# Patient Record
Sex: FEMALE | Race: WHITE | NOT HISPANIC OR LATINO
[De-identification: names, ages, dates, MRNs, and addresses within clinical notes are randomized per-mention and may not be internally consistent; named-entity substitution may affect disease eponyms.]

---

## 2018-03-02 PROBLEM — Z00.00 ENCOUNTER FOR PREVENTIVE HEALTH EXAMINATION: Status: ACTIVE | Noted: 2018-03-02

## 2018-04-03 ENCOUNTER — APPOINTMENT (OUTPATIENT)
Dept: VASCULAR SURGERY | Facility: CLINIC | Age: 58
End: 2018-04-03

## 2020-05-06 ENCOUNTER — INPATIENT (INPATIENT)
Facility: HOSPITAL | Age: 60
LOS: 1 days | Discharge: HOME HEALTH | End: 2020-05-08
Attending: INTERNAL MEDICINE | Admitting: INTERNAL MEDICINE
Payer: COMMERCIAL

## 2020-05-06 VITALS
RESPIRATION RATE: 17 BRPM | DIASTOLIC BLOOD PRESSURE: 78 MMHG | HEART RATE: 80 BPM | TEMPERATURE: 98 F | SYSTOLIC BLOOD PRESSURE: 168 MMHG | OXYGEN SATURATION: 97 %

## 2020-05-06 LAB
ALBUMIN SERPL ELPH-MCNC: 4.7 G/DL — SIGNIFICANT CHANGE UP (ref 3.5–5.2)
ALP SERPL-CCNC: 164 U/L — HIGH (ref 30–115)
ALT FLD-CCNC: 16 U/L — SIGNIFICANT CHANGE UP (ref 0–41)
ANION GAP SERPL CALC-SCNC: 20 MMOL/L — HIGH (ref 7–14)
APPEARANCE UR: CLEAR — SIGNIFICANT CHANGE UP
AST SERPL-CCNC: 11 U/L — SIGNIFICANT CHANGE UP (ref 0–41)
BACTERIA # UR AUTO: NEGATIVE — SIGNIFICANT CHANGE UP
BASOPHILS # BLD AUTO: 0.06 K/UL — SIGNIFICANT CHANGE UP (ref 0–0.2)
BASOPHILS NFR BLD AUTO: 0.5 % — SIGNIFICANT CHANGE UP (ref 0–1)
BILIRUB DIRECT SERPL-MCNC: 0.4 MG/DL — HIGH (ref 0–0.2)
BILIRUB INDIRECT FLD-MCNC: 2.7 MG/DL — HIGH (ref 0.2–1.2)
BILIRUB SERPL-MCNC: 3.1 MG/DL — HIGH (ref 0.2–1.2)
BILIRUB UR-MCNC: NEGATIVE — SIGNIFICANT CHANGE UP
BUN SERPL-MCNC: 20 MG/DL — SIGNIFICANT CHANGE UP (ref 10–20)
CALCIUM SERPL-MCNC: 9.7 MG/DL — SIGNIFICANT CHANGE UP (ref 8.5–10.1)
CHLORIDE SERPL-SCNC: 96 MMOL/L — LOW (ref 98–110)
CHOLEST SERPL-MCNC: 166 MG/DL — SIGNIFICANT CHANGE UP (ref 100–200)
CO2 SERPL-SCNC: 24 MMOL/L — SIGNIFICANT CHANGE UP (ref 17–32)
COLOR SPEC: YELLOW — SIGNIFICANT CHANGE UP
CREAT SERPL-MCNC: 0.7 MG/DL — SIGNIFICANT CHANGE UP (ref 0.7–1.5)
DIFF PNL FLD: NEGATIVE — SIGNIFICANT CHANGE UP
EOSINOPHIL # BLD AUTO: 0.04 K/UL — SIGNIFICANT CHANGE UP (ref 0–0.7)
EOSINOPHIL NFR BLD AUTO: 0.3 % — SIGNIFICANT CHANGE UP (ref 0–8)
EPI CELLS # UR: 4 /HPF — SIGNIFICANT CHANGE UP (ref 0–5)
GLUCOSE SERPL-MCNC: 206 MG/DL — HIGH (ref 70–99)
GLUCOSE UR QL: NEGATIVE — SIGNIFICANT CHANGE UP
HCT VFR BLD CALC: 46.6 % — SIGNIFICANT CHANGE UP (ref 37–47)
HDLC SERPL-MCNC: 25 MG/DL — LOW
HGB BLD-MCNC: 16 G/DL — SIGNIFICANT CHANGE UP (ref 12–16)
HYALINE CASTS # UR AUTO: 6 /LPF — SIGNIFICANT CHANGE UP (ref 0–7)
IMM GRANULOCYTES NFR BLD AUTO: 0.6 % — HIGH (ref 0.1–0.3)
KETONES UR-MCNC: ABNORMAL
LACTATE SERPL-SCNC: 1.8 MMOL/L — SIGNIFICANT CHANGE UP (ref 0.7–2)
LEUKOCYTE ESTERASE UR-ACNC: NEGATIVE — SIGNIFICANT CHANGE UP
LIDOCAIN IGE QN: 11 U/L — SIGNIFICANT CHANGE UP (ref 7–60)
LIPID PNL WITH DIRECT LDL SERPL: 110 MG/DL — SIGNIFICANT CHANGE UP (ref 4–129)
LYMPHOCYTES # BLD AUTO: 18.6 % — LOW (ref 20.5–51.1)
LYMPHOCYTES # BLD AUTO: 2.2 K/UL — SIGNIFICANT CHANGE UP (ref 1.2–3.4)
MCHC RBC-ENTMCNC: 29 PG — SIGNIFICANT CHANGE UP (ref 27–31)
MCHC RBC-ENTMCNC: 34.3 G/DL — SIGNIFICANT CHANGE UP (ref 32–37)
MCV RBC AUTO: 84.6 FL — SIGNIFICANT CHANGE UP (ref 81–99)
MONOCYTES # BLD AUTO: 0.85 K/UL — HIGH (ref 0.1–0.6)
MONOCYTES NFR BLD AUTO: 7.2 % — SIGNIFICANT CHANGE UP (ref 1.7–9.3)
NEUTROPHILS # BLD AUTO: 8.58 K/UL — HIGH (ref 1.4–6.5)
NEUTROPHILS NFR BLD AUTO: 72.8 % — SIGNIFICANT CHANGE UP (ref 42.2–75.2)
NITRITE UR-MCNC: NEGATIVE — SIGNIFICANT CHANGE UP
NRBC # BLD: 0 /100 WBCS — SIGNIFICANT CHANGE UP (ref 0–0)
PH UR: 6.5 — SIGNIFICANT CHANGE UP (ref 5–8)
PLATELET # BLD AUTO: 290 K/UL — SIGNIFICANT CHANGE UP (ref 130–400)
POTASSIUM SERPL-MCNC: 3.1 MMOL/L — LOW (ref 3.5–5)
POTASSIUM SERPL-SCNC: 3.1 MMOL/L — LOW (ref 3.5–5)
PROT SERPL-MCNC: 7.7 G/DL — SIGNIFICANT CHANGE UP (ref 6–8)
PROT UR-MCNC: ABNORMAL
RBC # BLD: 5.51 M/UL — HIGH (ref 4.2–5.4)
RBC # FLD: 13.1 % — SIGNIFICANT CHANGE UP (ref 11.5–14.5)
RBC CASTS # UR COMP ASSIST: 3 /HPF — SIGNIFICANT CHANGE UP (ref 0–4)
SARS-COV-2 RNA SPEC QL NAA+PROBE: SIGNIFICANT CHANGE UP
SODIUM SERPL-SCNC: 140 MMOL/L — SIGNIFICANT CHANGE UP (ref 135–146)
SP GR SPEC: >1.05 (ref 1.01–1.02)
TOTAL CHOLESTEROL/HDL RATIO MEASUREMENT: 6.6 RATIO — HIGH (ref 4–5.5)
TRIGL SERPL-MCNC: 144 MG/DL — SIGNIFICANT CHANGE UP (ref 10–149)
TROPONIN T SERPL-MCNC: <0.01 NG/ML — SIGNIFICANT CHANGE UP
UROBILINOGEN FLD QL: ABNORMAL
WBC # BLD: 11.8 K/UL — HIGH (ref 4.8–10.8)
WBC # FLD AUTO: 11.8 K/UL — HIGH (ref 4.8–10.8)
WBC UR QL: 3 /HPF — SIGNIFICANT CHANGE UP (ref 0–5)

## 2020-05-06 PROCEDURE — 71045 X-RAY EXAM CHEST 1 VIEW: CPT | Mod: 26

## 2020-05-06 PROCEDURE — 93010 ELECTROCARDIOGRAM REPORT: CPT

## 2020-05-06 PROCEDURE — 70450 CT HEAD/BRAIN W/O DYE: CPT | Mod: 26,59

## 2020-05-06 PROCEDURE — 99222 1ST HOSP IP/OBS MODERATE 55: CPT

## 2020-05-06 PROCEDURE — 70496 CT ANGIOGRAPHY HEAD: CPT | Mod: 26

## 2020-05-06 PROCEDURE — 76705 ECHO EXAM OF ABDOMEN: CPT | Mod: 26

## 2020-05-06 PROCEDURE — 99285 EMERGENCY DEPT VISIT HI MDM: CPT

## 2020-05-06 PROCEDURE — 70551 MRI BRAIN STEM W/O DYE: CPT | Mod: 26

## 2020-05-06 PROCEDURE — 70498 CT ANGIOGRAPHY NECK: CPT | Mod: 26

## 2020-05-06 RX ORDER — ASPIRIN/CALCIUM CARB/MAGNESIUM 324 MG
325 TABLET ORAL ONCE
Refills: 0 | Status: COMPLETED | OUTPATIENT
Start: 2020-05-06 | End: 2020-05-06

## 2020-05-06 RX ORDER — MECLIZINE HCL 12.5 MG
50 TABLET ORAL ONCE
Refills: 0 | Status: COMPLETED | OUTPATIENT
Start: 2020-05-06 | End: 2020-05-06

## 2020-05-06 RX ORDER — ALBUTEROL 90 UG/1
0 AEROSOL, METERED ORAL
Qty: 0 | Refills: 0 | DISCHARGE

## 2020-05-06 RX ORDER — CHLORHEXIDINE GLUCONATE 213 G/1000ML
1 SOLUTION TOPICAL
Refills: 0 | Status: DISCONTINUED | OUTPATIENT
Start: 2020-05-06 | End: 2020-05-08

## 2020-05-06 RX ORDER — ONDANSETRON 8 MG/1
4 TABLET, FILM COATED ORAL ONCE
Refills: 0 | Status: COMPLETED | OUTPATIENT
Start: 2020-05-06 | End: 2020-05-06

## 2020-05-06 RX ORDER — ATORVASTATIN CALCIUM 80 MG/1
80 TABLET, FILM COATED ORAL AT BEDTIME
Refills: 0 | Status: DISCONTINUED | OUTPATIENT
Start: 2020-05-06 | End: 2020-05-08

## 2020-05-06 RX ORDER — HEPARIN SODIUM 5000 [USP'U]/ML
5000 INJECTION INTRAVENOUS; SUBCUTANEOUS EVERY 8 HOURS
Refills: 0 | Status: DISCONTINUED | OUTPATIENT
Start: 2020-05-06 | End: 2020-05-08

## 2020-05-06 RX ORDER — SODIUM CHLORIDE 9 MG/ML
1000 INJECTION INTRAMUSCULAR; INTRAVENOUS; SUBCUTANEOUS
Refills: 0 | Status: DISCONTINUED | OUTPATIENT
Start: 2020-05-06 | End: 2020-05-07

## 2020-05-06 RX ORDER — SODIUM CHLORIDE 9 MG/ML
1000 INJECTION INTRAMUSCULAR; INTRAVENOUS; SUBCUTANEOUS ONCE
Refills: 0 | Status: COMPLETED | OUTPATIENT
Start: 2020-05-06 | End: 2020-05-06

## 2020-05-06 RX ORDER — POTASSIUM CHLORIDE 20 MEQ
40 PACKET (EA) ORAL ONCE
Refills: 0 | Status: COMPLETED | OUTPATIENT
Start: 2020-05-06 | End: 2020-05-06

## 2020-05-06 RX ORDER — ASPIRIN/CALCIUM CARB/MAGNESIUM 324 MG
81 TABLET ORAL DAILY
Refills: 0 | Status: DISCONTINUED | OUTPATIENT
Start: 2020-05-07 | End: 2020-05-07

## 2020-05-06 RX ADMIN — Medication 40 MILLIEQUIVALENT(S): at 11:17

## 2020-05-06 RX ADMIN — HEPARIN SODIUM 5000 UNIT(S): 5000 INJECTION INTRAVENOUS; SUBCUTANEOUS at 21:57

## 2020-05-06 RX ADMIN — Medication 50 MILLIGRAM(S): at 14:58

## 2020-05-06 RX ADMIN — SODIUM CHLORIDE 60 MILLILITER(S): 9 INJECTION INTRAMUSCULAR; INTRAVENOUS; SUBCUTANEOUS at 17:56

## 2020-05-06 RX ADMIN — ONDANSETRON 4 MILLIGRAM(S): 8 TABLET, FILM COATED ORAL at 14:58

## 2020-05-06 RX ADMIN — Medication 325 MILLIGRAM(S): at 14:29

## 2020-05-06 RX ADMIN — SODIUM CHLORIDE 1000 MILLILITER(S): 9 INJECTION INTRAMUSCULAR; INTRAVENOUS; SUBCUTANEOUS at 09:24

## 2020-05-06 RX ADMIN — SODIUM CHLORIDE 60 MILLILITER(S): 9 INJECTION INTRAMUSCULAR; INTRAVENOUS; SUBCUTANEOUS at 23:34

## 2020-05-06 NOTE — H&P ADULT - ATTENDING COMMENTS
note for 5/7/20 that was lost due to technical difficulties.    59 year old F with PMH of COPD (current smoker) who presents to ED for dizziness and vomiting that began last Thursday (1wk ago). She reports being unable to ambulate due to the dizziness.  Stayed mostly in bed then finally called 911. She has also had persistent nausea and two episodes of vomiting this past week.  She has never had symptoms like this before.  She denies any SOB, chest pain, fevers/chills, abdominal pain, or cough.  She does endorse decreased PO intake over the past week due to the nausea and vomiting. Has not seen PMD in 5yrs, had c-scopy and mammo 4 yrs ago. Lives alone.  In the ED, vitals stable. Labs significant for hypokalemia and hypochloremia.  CT head revealed scattered lacunar infarcts (of indeterminate age).    #Acute CVA: Dizziness, imbalance, nausea/vomiting- likely due to lacunar infarct  -CT head showing multiple lacunar infarcts of indeterminate age  -< from: MR Head No Cont (05.06.20 @ 18:58) >    MPRESSION:     1.  Small focus of restricted diffusion along the medial margin of the right middle cerebellar peduncle. Diagnostic consideration include acute lacunar infarct as well as demyelination in this location.  2.  Chronic infarct in the left basal ganglia/corona radiata and multiple chronic lacunar infarcts within bilateral deep gray nuclei and the central sabrina.  -admit to stroke unit  -s/p aspirin 325 mg in the ED, will start aspirin 81 mg daily  -started atorvastatin 80 mg  -CTA head and neck noted  -echo noted  -Speech/PT/OT eval  -smoking cessation    #COPD  -follows with Dr. Romario Maria  -c/w albuterol prn  -smoking cessation    #Hypokalemia, Hypochloremia 2/2 poor po intake  -potassium repleted  -follow up BMP  -cont with fluids    #Hyperbilirubinemia  -pt has history of cholecystectomy  -T bili, D bili and Alk Phos elevated  -rule out biliary obstruction vs intrahepatic cholestasis  -RUQ ultrasound ordered    #Progress Note Handoff  Pending (specify):  Consults____Clinical improvement and stability__x___Tests________PT___x_____  Pt/Family discussion: Pt informed and agrees with the current plan  Disposition: Home______/SNF____x___/To be determined________ note for 5/7/20 that was lost due to technical difficulties.    59 year old F with PMH of COPD (current smoker) who presents to ED for dizziness and vomiting that began last Thursday (1wk ago). She reports being unable to ambulate due to the dizziness.  Stayed mostly in bed then finally called 911. She has also had persistent nausea and two episodes of vomiting this past week.  She has never had symptoms like this before.  She denies any SOB, chest pain, fevers/chills, abdominal pain, or cough.  She does endorse decreased PO intake over the past week due to the nausea and vomiting. Has not seen PMD in 5yrs, had c-scopy and mammo 4 yrs ago. Lives alone.  In the ED, vitals stable. Labs significant for hypokalemia and hypochloremia.  CT head revealed scattered lacunar infarcts (of indeterminate age).    Denies CP, SOB, N/V/D/C/AP, cough, F, chills, new focal weakness, HA, vision changes, dysuria, or urinary symptoms, blood in stool.    ROS: all systems unremarkable except as above.     Gen: NAD, AA0x3, poor dentition  HEENT: PERRLA, EOMI, no LAD  CV: nl S1 S2  Resp: decreased BS b/l  GI: NT/ND/S +BS  MS: no c/c/e, +pulses  Neuro: Rt sided dysmetria, otherwise motor/sens intact    EKG - nonspecific changes (my read)  Chart and consultant notes personally reviewed.  Care Discussed with Consultants/Other Providers/ Housestaff [ x] YES [ ] NO   Radiology, labs, old records personally reviewed.  #Acute CVA: Dizziness, imbalance, nausea/vomiting- likely due to lacunar infarct  -CT head showing multiple lacunar infarcts of indeterminate age  -< from: MR Head No Cont (05.06.20 @ 18:58) >    MPRESSION:     1.  Small focus of restricted diffusion along the medial margin of the right middle cerebellar peduncle. Diagnostic consideration include acute lacunar infarct as well as demyelination in this location.  2.  Chronic infarct in the left basal ganglia/corona radiata and multiple chronic lacunar infarcts within bilateral deep gray nuclei and the central sabrina.  -admit to stroke unit  -s/p aspirin 325 mg in the ED, will start aspirin 81 mg daily  -started atorvastatin 80 mg  -CTA head and neck noted  -echo noted  -Speech/PT/OT eval  -smoking cessation    #COPD  -follows with Dr. Romario Maria  -c/w albuterol prn  -smoking cessation    #Hypokalemia, Hypochloremia 2/2 poor po intake  -potassium repleted  -follow up BMP  -cont with fluids    #Hyperbilirubinemia  -pt has history of cholecystectomy  -T bili, D bili and Alk Phos elevated  -rule out biliary obstruction vs intrahepatic cholestasis  -RUQ ultrasound ordered    #Progress Note Handoff  Pending (specify):  Consults____Clinical improvement and stability__x___Tests________PT___x_____  Pt/Family discussion: Pt informed and agrees with the current plan  Disposition: Home______/SNF____x___/To be determined________

## 2020-05-06 NOTE — ED PROVIDER NOTE - ATTENDING CONTRIBUTION TO CARE
58 yo F presents to ED for dizziness and weakness that started about 1 week ago. She states she feels like the room is spinning and she is so dizzy she has been unable to walk. Associated nausea but no vomiting. Diarrhea. No cough, SOB, fever or chills. Associated urinary frequency but no abdominal pain.   Patient has chronic pain and discoloration in her lower extremities from a past accident.     On PE patient appears older than stated age.   Cardio RRR, Lungs CTA. abdomen SNTND, no nystagmus, cranial nerves intact, normal finger to nose, equal strength in upper and lower extremities.     Concern for electrolyte abnormalities vs CVA     Will do labs, CT head

## 2020-05-06 NOTE — H&P ADULT - NSHPREVIEWOFSYSTEMS_GEN_ALL_CORE
CONSTITUTIONAL: No weakness, fevers or chills  EYES/ENT: No visual changes;  No vertigo or throat pain   NECK: No pain or stiffness  RESPIRATORY: No cough, wheezing, hemoptysis; No shortness of breath  CARDIOVASCULAR: No chest pain or palpitations  GASTROINTESTINAL: No abdominal or epigastric pain. +nausea and vomiting. No diarrhea or constipation. No melena or hematochezia.  GENITOURINARY: No dysuria, frequency or hematuria  NEUROLOGICAL: No numbness or weakness  SKIN: No itching, rashes

## 2020-05-06 NOTE — CONSULT NOTE ADULT - ASSESSMENT
60 yo female presents with acute onset dizziness, vomiting and loss of balance lasting for almost a week. CTH with multiple lacunar infarcts of different chronicities. Current NIHSS is 2.     Plan:  Admit to stroke unit  Start Aspirin 81, Statin 80, NS IVF  lipid profile, HA1C  CTA head and neck  MRI brain NC  Speech/PT/OT eval          Discussed with neuroattending Dr. Santos Gu

## 2020-05-06 NOTE — H&P ADULT - NSHPLABSRESULTS_GEN_ALL_CORE
16.0   11.80 )-----------( 290      ( 06 May 2020 09:09 )             46.6       05-06    140  |  96<L>  |  20  ----------------------------<  206<H>  3.1<L>   |  24  |  0.7    Ca    9.7      06 May 2020 09:09    TPro  7.7  /  Alb  4.7  /  TBili  3.1<H>  /  DBili  0.4<H>  /  AST  11  /  ALT  16  /  AlkPhos  164<H>  05-06                      Lactate Trend  05-06 @ 09:09 Lactate:1.8       CARDIAC MARKERS ( 06 May 2020 09:09 )  x     / <0.01 ng/mL / x     / x     / x            CAPILLARY BLOOD GLUCOSE      < from: Xray Chest 1 View-PORTABLE IMMEDIATE (05.06.20 @ 10:39) >    pression:      Hazy left basilar opacity. Radiographic follow-up recommended.    < end of copied text >    < from: CT Head No Cont (05.06.20 @ 12:31) >    IMPRESSION:     1.  No evidence of acute intracranial hemorrhage, mass effect or midline shift.    2.  Scattered lacunar infarcts of indeterminate age. MRI brain may be obtained for further evaluation as clinically warranted.      < end of copied text >

## 2020-05-06 NOTE — H&P ADULT - HISTORY OF PRESENT ILLNESS
59 year old F with PMH of COPD (current smoker) who presents to ED for dizziness and vomiting that began last Thursday. She reports being unable to ambulate due to the dizziness.  She has also had persistent nausea and two episodes of vomiting this past week.  She has never had symptoms like this before.  She denies any SOB, chest pain, fevers/chills, abdominal pain, or cough.  She does endorse decreased PO intake over the past week due to the nausea and vomiting.      In the ED, vitals stable. Labs significant for hypokalemia and hypochloremia.  CT head revealed scattered lacunar infarcts (of indeterminate age).

## 2020-05-06 NOTE — CONSULT NOTE ADULT - ATTENDING COMMENTS
Patient seen and examined 5/6/20 in the afternoon and agree with above except as noted.  Patients history obtained form patient and labs and imaging reviewed personally.  Patient has been having nausea vomitting and dizziness with inability to walk normally for the last few days.  No history of stroke but found to have multiple lacunar strokes on CTH.  NIHSS 2 on exam and mrankin baseline 0    Plan as above (likely new acute infarct on the left brainstem subcoritcal region vs right cerebellum)  Naive to aspirin and statin.

## 2020-05-06 NOTE — ED PROVIDER NOTE - PROGRESS NOTE DETAILS
Patient evaluated by Neuro Leeann recommends admission to stroke unit. Spoke to Dr Gil accepts admission.

## 2020-05-06 NOTE — ED ADULT NURSE NOTE - NSIMPLEMENTINTERV_GEN_ALL_ED
Implemented All Universal Safety Interventions:  [place] to call system. Call bell, personal items and telephone within reach. Instruct patient to call for assistance. Room bathroom lighting operational. Non-slip footwear when patient is off stretcher. Physically safe environment: no spills, clutter or unnecessary equipment. Stretcher in lowest position, wheels locked, appropriate side rails in place.

## 2020-05-06 NOTE — ED PROVIDER NOTE - CLINICAL SUMMARY MEDICAL DECISION MAKING FREE TEXT BOX
58 yo F presented to ED for dizziness. She was evaluated by neurology who recommended stroke unit for further workup. Patient admitted for further management

## 2020-05-06 NOTE — H&P ADULT - ASSESSMENT
60 y/o female with PMH of COPD presents with dizziness, vomiting, and loss of balance that began 1 week ago. CTH with multiple lacunar infarcts.  Neurology was consulted- NIHSS of 2 (RUE motor and limb ataxia).  Patient will be admitted to stroke unit for stroke workup.    #Dizziness, imbalance, nausea/vomiting- likely due to lacunar infarcts  -CT head showing multiple lacunar infarcts of indeterminate age  -neurology following  -admit to stroke unit  -s/p aspirin 325 mg in the ED, will start aspirin 81 mg daily  -started atorvastatin 80 mg  -lipid profile, HbA1C  -CTA head and neck pending  -MRI brain NC pending  -echo with bubble study  -Speech/PT/OT eval  -NPO until speech eval  -c/w IV hydration, NS @60    #COPD  -follows with Dr. Romario Maria  -c/w albuterol prn    #Hypokalemia, Hypochloremia 2/2 poor po intake  -potassium repleted  -follow up BMP  -cont with fluids    #Hyperbilirubinemia  -pt has history of cholecystectomy  -T bili, D bili and Alk Phos elevated  -rule out biliary obstruction vs intrahepatic cholestasis  -RUQ ultrasound ordered    Diet NPO  DVT ppx heparin  FULL CODE

## 2020-05-06 NOTE — CONSULT NOTE ADULT - SUBJECTIVE AND OBJECTIVE BOX
OPAL CLIFTON    Chief Complaint: dizziness/gait instability/N/V    Right Handed    HPI:: 58 yo F with pmh of COPD, PVD, current everyday smoker, presents to ED for dizziness and weakness that started about 1 week ago. She states she feels like the room is spinning and she is so dizzy she has been unable to walk. Associated nausea but no vomiting. on exam +dysmetria on the right. CTH with multiple lacunar infarcts of different chronicities.  No cough, SOB, fever or chills. Associated urinary frequency but no abdominal pain.   Patient has chronic pain and discoloration in her lower extremities from a past accident.       Relevant PMH:  [] Prior ischemic stroke/TIA  [] Afib  []CAD  []HTN  []DLD  []DM [x]PVD []Obesity [] Sedintary lifestyle []CHF  []DARION COPD  []Cancer Hx     Social History: [] Smoking [x]  Drug Use: []   Alcohol Use:   [] Other:      Possible Location of Stroke:    Possible Cause of Stroke:    Relevant Cerebral Imaging:    Relevant Cervicocerebral Imaging:          Relevant blood tests:      Relevant cardiac rhythm monitoring:    Relevant Cardiac Structure:(TTE/ARNOLD +/-):[]No intracardiac thrombus/[] no vegetation/[]no [place]ia/EF:      Home Medications:      MEDICATIONS  (STANDING):  aspirin 325 milliGRAM(s) Oral Once      PT/OT/Speech/Rehab/S&Swr:    Exam:    Vital Signs Last 24 Hrs  T(C): 36.8 (06 May 2020 08:49), Max: 36.8 (06 May 2020 08:49)  T(F): 98.2 (06 May 2020 08:49), Max: 98.2 (06 May 2020 08:49)  HR: 80 (06 May 2020 08:49) (80 - 80)  BP: 168/78 (06 May 2020 08:49) (168/78 - 168/78)  BP(mean): --  RR: 17 (06 May 2020 08:49) (17 - 17)  SpO2: 97% (06 May 2020 08:49) (97% - 97%)    NIHSS      LOC:       1a: 0    1b(Questions): 0          1c(Instructions): 0            Best Gaze:0  Visual:0  Motor:                 RUE: 1    RLE:  0  LUE:  0   LLE: 0    FACE:   0  Limb Ataxia: 1  Sensory:   0    Language:   0    Dysarthria: 0         Extinction and Inattention:0    NIHSS on admission:          NIHSS yesterday:          NIHSS today: 2            m-RS: 3    Impression:      Suggestion:  Routine stroke workup including:    Disposition:

## 2020-05-07 LAB
A1C WITH ESTIMATED AVERAGE GLUCOSE RESULT: 7 % — HIGH (ref 4–5.6)
ALBUMIN SERPL ELPH-MCNC: 4 G/DL — SIGNIFICANT CHANGE UP (ref 3.5–5.2)
ALP SERPL-CCNC: 133 U/L — HIGH (ref 30–115)
ALT FLD-CCNC: 13 U/L — SIGNIFICANT CHANGE UP (ref 0–41)
ANION GAP SERPL CALC-SCNC: 13 MMOL/L — SIGNIFICANT CHANGE UP (ref 7–14)
AST SERPL-CCNC: 11 U/L — SIGNIFICANT CHANGE UP (ref 0–41)
BASOPHILS # BLD AUTO: 0.05 K/UL — SIGNIFICANT CHANGE UP (ref 0–0.2)
BASOPHILS NFR BLD AUTO: 0.5 % — SIGNIFICANT CHANGE UP (ref 0–1)
BILIRUB SERPL-MCNC: 2.3 MG/DL — HIGH (ref 0.2–1.2)
BUN SERPL-MCNC: 13 MG/DL — SIGNIFICANT CHANGE UP (ref 10–20)
CALCIUM SERPL-MCNC: 8.7 MG/DL — SIGNIFICANT CHANGE UP (ref 8.5–10.1)
CHLORIDE SERPL-SCNC: 102 MMOL/L — SIGNIFICANT CHANGE UP (ref 98–110)
CO2 SERPL-SCNC: 28 MMOL/L — SIGNIFICANT CHANGE UP (ref 17–32)
CREAT SERPL-MCNC: 0.7 MG/DL — SIGNIFICANT CHANGE UP (ref 0.7–1.5)
CULTURE RESULTS: SIGNIFICANT CHANGE UP
EOSINOPHIL # BLD AUTO: 0.07 K/UL — SIGNIFICANT CHANGE UP (ref 0–0.7)
EOSINOPHIL NFR BLD AUTO: 0.7 % — SIGNIFICANT CHANGE UP (ref 0–8)
ESTIMATED AVERAGE GLUCOSE: 154 MG/DL — HIGH (ref 68–114)
GLUCOSE SERPL-MCNC: 123 MG/DL — HIGH (ref 70–99)
HCT VFR BLD CALC: 41 % — SIGNIFICANT CHANGE UP (ref 37–47)
HCV AB S/CO SERPL IA: 0.04 COI — SIGNIFICANT CHANGE UP
HCV AB SERPL-IMP: SIGNIFICANT CHANGE UP
HGB BLD-MCNC: 13.9 G/DL — SIGNIFICANT CHANGE UP (ref 12–16)
IMM GRANULOCYTES NFR BLD AUTO: 0.5 % — HIGH (ref 0.1–0.3)
LYMPHOCYTES # BLD AUTO: 2.54 K/UL — SIGNIFICANT CHANGE UP (ref 1.2–3.4)
LYMPHOCYTES # BLD AUTO: 25.1 % — SIGNIFICANT CHANGE UP (ref 20.5–51.1)
MCHC RBC-ENTMCNC: 30 PG — SIGNIFICANT CHANGE UP (ref 27–31)
MCHC RBC-ENTMCNC: 33.9 G/DL — SIGNIFICANT CHANGE UP (ref 32–37)
MCV RBC AUTO: 88.6 FL — SIGNIFICANT CHANGE UP (ref 81–99)
MONOCYTES # BLD AUTO: 0.72 K/UL — HIGH (ref 0.1–0.6)
MONOCYTES NFR BLD AUTO: 7.1 % — SIGNIFICANT CHANGE UP (ref 1.7–9.3)
NEUTROPHILS # BLD AUTO: 6.69 K/UL — HIGH (ref 1.4–6.5)
NEUTROPHILS NFR BLD AUTO: 66.1 % — SIGNIFICANT CHANGE UP (ref 42.2–75.2)
NRBC # BLD: 0 /100 WBCS — SIGNIFICANT CHANGE UP (ref 0–0)
PLATELET # BLD AUTO: 236 K/UL — SIGNIFICANT CHANGE UP (ref 130–400)
POTASSIUM SERPL-MCNC: 3.6 MMOL/L — SIGNIFICANT CHANGE UP (ref 3.5–5)
POTASSIUM SERPL-SCNC: 3.6 MMOL/L — SIGNIFICANT CHANGE UP (ref 3.5–5)
PROT SERPL-MCNC: 6.4 G/DL — SIGNIFICANT CHANGE UP (ref 6–8)
RBC # BLD: 4.63 M/UL — SIGNIFICANT CHANGE UP (ref 4.2–5.4)
RBC # FLD: 13.4 % — SIGNIFICANT CHANGE UP (ref 11.5–14.5)
SODIUM SERPL-SCNC: 143 MMOL/L — SIGNIFICANT CHANGE UP (ref 135–146)
SPECIMEN SOURCE: SIGNIFICANT CHANGE UP
WBC # BLD: 10.12 K/UL — SIGNIFICANT CHANGE UP (ref 4.8–10.8)
WBC # FLD AUTO: 10.12 K/UL — SIGNIFICANT CHANGE UP (ref 4.8–10.8)

## 2020-05-07 PROCEDURE — 99223 1ST HOSP IP/OBS HIGH 75: CPT | Mod: AI

## 2020-05-07 PROCEDURE — 93306 TTE W/DOPPLER COMPLETE: CPT | Mod: 26

## 2020-05-07 PROCEDURE — 71045 X-RAY EXAM CHEST 1 VIEW: CPT | Mod: 26

## 2020-05-07 RX ORDER — ASPIRIN/CALCIUM CARB/MAGNESIUM 324 MG
162 TABLET ORAL DAILY
Refills: 0 | Status: DISCONTINUED | OUTPATIENT
Start: 2020-05-07 | End: 2020-05-08

## 2020-05-07 RX ADMIN — HEPARIN SODIUM 5000 UNIT(S): 5000 INJECTION INTRAVENOUS; SUBCUTANEOUS at 13:32

## 2020-05-07 RX ADMIN — Medication 162 MILLIGRAM(S): at 11:12

## 2020-05-07 RX ADMIN — HEPARIN SODIUM 5000 UNIT(S): 5000 INJECTION INTRAVENOUS; SUBCUTANEOUS at 05:38

## 2020-05-07 RX ADMIN — CHLORHEXIDINE GLUCONATE 1 APPLICATION(S): 213 SOLUTION TOPICAL at 05:38

## 2020-05-07 RX ADMIN — ATORVASTATIN CALCIUM 80 MILLIGRAM(S): 80 TABLET, FILM COATED ORAL at 21:06

## 2020-05-07 RX ADMIN — HEPARIN SODIUM 5000 UNIT(S): 5000 INJECTION INTRAVENOUS; SUBCUTANEOUS at 21:06

## 2020-05-07 NOTE — OCCUPATIONAL THERAPY INITIAL EVALUATION ADULT - PLANNED THERAPY INTERVENTIONS, OT EVAL
strengthening/fine motor coordination training/transfer training/ADL retraining/bed mobility training/balance training/neuromuscular re-education

## 2020-05-07 NOTE — PHYSICAL THERAPY INITIAL EVALUATION ADULT - GENERAL OBSERVATIONS, REHAB EVAL
10:30-11:00.  Pt. now sitting up in chair.Had O.T.  session-just ended. Feeling ok and wants P.T. as well.   On heart monitor. A & O x 3.  Pleasant,cooperative. P.T. Eval/assessm/tx. done. Worked on UE/LE exs and gross R UE coord. Transfers/stand bal. amb/gait training.  Returned to chair after amb. Lap belt in place. No apparent distress. Pulse O2=95% after amb. Resting in chair-comfortable. ANITA Barker present in room t/o tx. and saw function of pt. Discussed w/ her.  Will f/u w/ pt. to improve bal/amb safety. 10:30-10:58.  Pt. now sitting up in chair.Had O.T.  session-just ended. Feeling ok and wants P.T. as well.   On heart monitor. A & O x 3.  Pleasant,cooperative. P.T. Eval/assessm/tx. done. Worked on UE/LE exs and gross R UE coord. Transfers/stand bal. amb/gait training.  Returned to chair after amb. Lap belt in place. No apparent distress. Pulse O2=95% after amb. Resting in chair-comfortable. ANITA Barker present in room t/o tx. and saw function of pt. Discussed w/ her.  Will f/u w/ pt. to improve bal/amb safety.

## 2020-05-07 NOTE — PROGRESS NOTE ADULT - SUBJECTIVE AND OBJECTIVE BOX
HPI  Patient is a 59y old Female who presents with a chief complaint of lacunar strokes on CT (06 May 2020 14:23)    Currently admitted to medicine with the primary diagnosis of Dizziness     Today is hospital day 1d.     INTERVAL HPI / OVERNIGHT EVENTS:  Patient was examined and seen at bedside. This morning she is resting comfortably in bed and reports no new issues or overnight events.     ROS: Otherwise unremarkable     PAST MEDICAL & SURGICAL HISTORY  PVD (peripheral vascular disease)  COPD (chronic obstructive pulmonary disease)    ALLERGIES  codeine (Rash)    MEDICATIONS  STANDING MEDICATIONS  aspirin enteric coated 162 milliGRAM(s) Oral daily  atorvastatin 80 milliGRAM(s) Oral at bedtime  chlorhexidine 4% Liquid 1 Application(s) Topical <User Schedule>  heparin   Injectable 5000 Unit(s) SubCutaneous every 8 hours    PRN MEDICATIONS    VITALS:  T(F): 98.1  HR: 87  BP: 128/59  RR: 18  SpO2: 96%    	PHYSICAL EXAM:  	GENERAL: NAD  	HEAD:  Atraumatic, Normocephalic  	EYES: EOMI, PERRLA, conjunctiva and sclera clear  	ENT:No nasal obstruction or discharge. No tonsillar exudate, swelling or erythema.  	NECK: Supple, No JVD  	CHEST/LUNG: Clear to auscultation bilaterally; No wheeze  	HEART: Regular rate and rhythm; No murmurs, rubs, or gallops  	ABDOMEN: Soft, Nontender, Nondistended; Bowel sounds present  	EXTREMITIES:  2+ Peripheral Pulses, No clubbing, cyanosis, or edema  	NEURO: AAOx3, motor strength intact, CN intact, no focal deficits, no facial droop, no slurred speech, +dysmetria  SKIN: No rashes or lesions    LABS                        13.9   10.12 )-----------( 236      ( 07 May 2020 04:36 )             41.0     05-07    143  |  102  |  13  ----------------------------<  123<H>  3.6   |  28  |  0.7    Ca    8.7      07 May 2020 04:36    TPro  6.4  /  Alb  4.0  /  TBili  2.3<H>  /  DBili  x   /  AST  11  /  ALT  13  /  AlkPhos  133<H>  05-07      Urinalysis Basic - ( 06 May 2020 22:13 )    Color: Yellow / Appearance: Clear / SG: >1.050 / pH: x  Gluc: x / Ketone: Large  / Bili: Negative / Urobili: 3 mg/dL   Blood: x / Protein: 30 mg/dL / Nitrite: Negative   Leuk Esterase: Negative / RBC: 3 /HPF / WBC 3 /HPF   Sq Epi: x / Non Sq Epi: 4 /HPF / Bacteria: Negative            CARDIAC MARKERS ( 06 May 2020 09:09 )  x     / <0.01 ng/mL / x     / x     / x          RADIOLOGY  < from: CT Angio Neck w/ IV Cont (05.06.20 @ 17:57) >  IMPRESSION:    1.  No evidence of acute large vessel occlusion.    2.  CTA neck: Scattered calcific plaque without significant stenosis.    3.  CTA head: Calcific plaque of the precavernous and cavernous segments of the internal carotid arteries with moderate bilateral stenoses. Moderate stenoses of the left PCA involving the mid/distal P1 and proximal P2 segments.    4.  Pulmonary emphysema.      < end of copied text >  < from: CT Head No Cont (05.06.20 @ 12:31) >  IMPRESSION:     1.  No evidence of acute intracranial hemorrhage, mass effect or midline shift.    2.  Scattered lacunar infarcts of indeterminate age. MRI brain may be obtained for further evaluation as clinically warranted.      < end of copied text >  < from: MR Head No Cont (05.06.20 @ 18:58) >  IMPRESSION:     1.  Small focus of restricted diffusion along the medial margin of the right middle cerebellar peduncle. Diagnostic consideration include acute lacunar infarct as well as demyelination in this location.    2.  Chronic infarct in the left basal ganglia/corona radiata and multiple chronic lacunar infarcts within bilateral deep gray nuclei and the central sabrina.    < end of copied text >  < from: Xray Chest 1 View- PORTABLE-Routine (05.07.20 @ 06:15) >  Impression:    No focal parenchymal opacities, effusion or pneumothorax..  Previous left basilar opacity is no longer identified      < end of copied text >    < from: Transthoracic Echocardiogram (05.07.20 @ 07:27) >    Summary:   1. LV Ejection Fraction by Hogue's Method with a biplane EF of 68 %.   2. Mildly increased LV wall thickness.   3. Spectral Doppler shows impaired relaxation pattern of left ventricular myocardial filling (Grade I diastolic dysfunction).   4. Color flow doppler and intravenous injection of agitated saline demonstrates the presence of an intact intra atrial septum.      < end of copied text >  < from: US Abdomen Limited (05.06.20 @ 22:42) >  IMPRESSION:    Echogenic liver, consistent with hepatic steatosis or hepatocellular disease.    CBD dilatation, which can be seen after cholecystectomy. Consider MRCP to exclude distal CBD obstruction if clinically warranted.      < end of copied text >

## 2020-05-07 NOTE — PROGRESS NOTE ADULT - NSHPATTENDINGPLANDISCUSS_GEN_ALL_CORE
our [place] and Medicine team during the stroke unit multidisciplinary round. Also spoke to the patient as well.

## 2020-05-07 NOTE — PROGRESS NOTE ADULT - SUBJECTIVE AND OBJECTIVE BOX
STROKE PROGRESS NOTE    Chief Complaint: dizziness/gait instability/N/V    HPI: This is a 59 years old right handed woman with past medical history of COPD, PVD, current everyday smoker, presents to ED for dizziness and weakness that started about 1 week ago. She states she feels like the room is spinning and she is so dizzy she has been unable to walk. Associated nausea but no vomiting. On Neurological exam, she is with +dysmetria on the right. CTH with multiple lacunar infarcts of different chronicities.  No cough, SOB, fever or chills. Associated urinary frequency but no abdominal pain.   Patient has chronic pain and discoloration in her lower extremities from a past accident.     INTERVAL HISTORY: No overnight events and no tele events.     Relevant PMH:  [] Prior ischemic stroke/TIA  [] Afib  []CAD  []HTN  []DLD  []DM [x]PVD []Obesity [] Sedintary lifestyle []CHF  []DARION COPD  []Cancer Hx     Social History: [] Smoking [x]  Drug Use: []   Alcohol Use:   [] Other:      Possible Location of Stroke: Inferior right middle cerebellar peduncle    Possible Cause of Stroke: Likely small vessel disease related    Relevant Cerebral Imaging:  < from: MR Head No Cont (05.06.20 @ 18:58) >  IMPRESSION:   1.  Small focus of restricted diffusion along the medial margin of the right middle cerebellar peduncle. Diagnostic consideration include acute lacunar infarct as well as demyelination in this location.  2.  Chronic infarct in the left basal ganglia/corona radiata and multiple chronic lacunar infarcts within bilateral deep gray nuclei and the central sabrina.    < from: CT Head No Cont (05.06.20 @ 12:31) >  IMPRESSION:   1.  No evidence of acute intracranial hemorrhage, mass effect or midline shift.  2.  Scattered lacunar infarcts of indeterminate age. MRI brain may be obtained for further evaluation as clinically warranted.    Relevant Cervicocerebral Imaging:  < from: CT Angio Neck w/ IV Cont (05.06.20 @ 17:57) >  Findings:  NECK:  A visualized aortic arch and the greatvessel origins demonstrate calcific plaque without significant stenosis. There is a shared origin of the brachiocephalic and left common carotid arteries.  There are scattered calcific plaque involving the common carotid arteries and the carotid bifurcations without significant stenosis.  The vertebral arteries are patent.    HEAD:  There are long segment atheromatous changes involving the precavernous and cavernous segments of the internal carotid arteries with moderate bilateral stenoses. The anterior and middle cerebral arteries are patent.  The distal segments of the vertebral arteries are patent and the basilar artery are patent. There are moderate stenoses of the left PCA involving the mid-distal P1 segment and proximal P2 segment.    OTHER:  Bilateral centrilobular emphysematous changes of the lungs.   Bilateral dependent peripheral lung reticulations, likely representing atelectasis.  Degenerative changes of the cervical spine.  Multiple missing teeth with remaining teeth demonstrating periapical lucencies.    IMPRESSION:  1.  No evidence of acute large vessel occlusion.  2.  CTA neck: Scattered calcific plaque without significant stenosis.  3.  CTA head: Calcific plaque of the precavernous and cavernous segments of the internal carotid arteries with moderate bilateral stenoses. Moderate stenoses of the left PCA involving the mid/distal P1 and proximal P2 segments.  4.  Pulmonary emphysema.    Relevant blood tests:  Lipid Profile (05.06.20 @ 21:14)    Total Cholesterol/HDL Ratio Measurement: 6.6 Ratio    Cholesterol, Serum: 166 mg/dL    Triglycerides, Serum: 144 mg/dL    HDL Cholesterol, Serum: 25: HDL Levels >/= 60 mg/dL are considered beneficial and a "negative" risk  factor.  Effective 08/15/2018: New reference range and interpretive comment. mg/dL    Direct LDL: 110: LDL Cholesterol (mg/dL) --- Interpretive Comment (for adults 18 and over)    A1C with Estimated Average Glucose (05.06.20 @ 21:14)    A1C with Estimated Average Glucose Result: 7.0: Method: Immunoassay    Relevant cardiac rhythm monitoring:   < from: 12 Lead ECG (05.06.20 @ 11:02) >  Ventricular Rate 73 BPM  Atrial Rate 227 BPM  QRS Duration 86 ms  Q-T Interval 386 ms  QTC Calculation(Bezet) 425 ms  P Axis 70 degrees  R Axis 65 degrees  T Axis 252 degrees  Diagnosis Line Sinus rhythm  T wave abnormality, consider inferior ischemia  T wave abnormality, consider anterolateral ischemia  Abnormal ECG    Relevant Cardiac Structure:(TTE/ARNOLD +/-):[]No intracardiac thrombus/[] no vegetation/[]no akynesia/EF:  < from: Transthoracic Echocardiogram (05.07.20 @ 07:27) >  Summary:   1. LV Ejection Fraction by Hogue's Method with a biplane EF of 68 %.   2. Mildly increased LV wall thickness.   3. Spectral Doppler shows impaired relaxation pattern of left ventricular myocardial filling (Grade I diastolic dysfunction).   4. Color flow doppler and intravenous injection of agitated saline demonstrates the presence of an intact intra atrial septum.    PHYSICIAN INTERPRETATION:  Left Ventricle: The left ventricular internal cavity size is normal. Left ventricular wall thickness is mildly increased. Spectral Doppler shows impaired relaxation pattern of left ventricular myocardial filling (Grade I diastolic dysfunction).  Right Ventricle: Normal right ventricular size and function.  Left Atrium: Normal left atrial size. Color flow doppler and intravenous injection of agitated saline demonstrates the presence of an intact intra atrial septum.  Right Atrium: Normal right atrial size.  Pericardium: Trivial pericardial effusion is present.  Mitral Valve: Structurally normal mitral valve, with normal leaflet excursion. The mitral valve is normal in structure. No evidence of mitral valve regurgitation is seen.  Tricuspid Valve: Structurally normal tricuspid valve, with normal leaflet excursion. The tricuspid valve is normal in structure. Trivial tricuspid regurgitation is visualized.  Aortic Valve: Normal trileaflet aortic valve with normal opening. The aortic valve is normal. No evidence of aortic valve regurgitation is seen.  Pulmonic Valve: Structurally normal pulmonic valve, with normal leaflet excursion. The pulmonic valve is normal. No indication of pulmonic valve regurgitation.  Aorta: The aortic root and ascending aorta are structurally normal, with no evidence of dilitation.  Pulmonary Artery: The main pulmonary artery is normal in size. Normal pulmonary artery pressure.  Shunts: Agitated saline contrast was given intravenously to evaluate for intracardiac shunting.    MEDICATIONS  (STANDING):  aspirin enteric coated 162 milliGRAM(s) Oral daily  atorvastatin 80 milliGRAM(s) Oral at bedtime  chlorhexidine 4% Liquid 1 Application(s) Topical <User Schedule>  heparin   Injectable 5000 Unit(s) SubCutaneous every 8 hours    PT/OT/Speech/Rehab/S&Swr: Completed    Exam:  Vital Signs Last 24 Hrs  T(C): 36.8 (06 May 2020 08:49), Max: 36.8 (06 May 2020 08:49)  T(F): 98.2 (06 May 2020 08:49), Max: 98.2 (06 May 2020 08:49)  HR: 80 (06 May 2020 08:49) (80 - 80)  BP: 168/78 (06 May 2020 08:49) (168/78 - 168/78)  BP(mean): --  RR: 17 (06 May 2020 08:49) (17 - 17)  SpO2: 97% (06 May 2020 08:49) (97% - 97%)    Neurologic Exam:  Mental status: Awake, alert and oriented x 3. Attention and concentration intact. Fund of knowledge appropriate.  Language: Naming, repetition, fluency, and comprehension intact. No dysarthria, no aphasia.  Cranial nerves: Fundoscopic exam demonstrated no abnormalities, pupils equally round and reactive to light, visual fields full, no nystagmus, extraocular muscles intact, V1 through V3 intact bilaterally and symmetric, face symmetric, hearing intact to finger rub, palate elevation symmetric, tongue was midline, sternocleidomastoid/shoulder shrug strength bilaterally 5/5.    Motor:  Normal bulk and tone, strength 5/5 in bilateral upper and lower extremities.   strength 5/5.  Rapid alternating movements intact and symmetric.   Sensation: Intact to light touch, proprioception, and pinprick.  No neglect.   Coordination: No dysmetria on finger-to-nose and heel-to-shin.  No clumsiness.  Reflexes: 2+ in upper and lower extremities, downgoing toes bilaterally  Gait: Narrow and steady. No ataxia.  Romberg negative    Ext: discoloration in b/l feet     NIH STROKE SCALE  Item	                                                        Score  1 a.	Level of Consciousness	               	0  1 b. LOC Questions	                                    0  1 c.	LOC Commands	                               	0  2.	Best Gaze	                                    0  3.	Visual	                                                0  4.	Facial Palsy	                                    0  5 a.	Motor Arm - Left	                        0  5 b.	Motor Arm - Right	                        1  6 a.	Motor Leg - Left	                        0  6 b.	Motor Leg - Right	                        0  7.	Limb Ataxia	                                    1  8.	Sensory	                                                0  9.	Language	                                    0  10.	Dysarthria	                                    0  11.	Extinction and Inattention  	            0  ___________________________________________________________________  NIHSS on admission:  2        NIHSS yesterday:     2     NIHSS today: 2      mRS: 3 Moderate disability; requiring some help, but able to walk without assistance

## 2020-05-07 NOTE — PROGRESS NOTE ADULT - ASSESSMENT
58 y/o female with PMH of COPD presents with dizziness, vomiting, and loss of balance that began 1 week ago. CTH with multiple lacunar infarcts.  Neurology was consulted- NIHSS of 2 (RUE motor and limb ataxia).  Patient will be admitted to stroke unit for stroke workup.    #Dizziness, imbalance, nausea/vomiting- likely due to lacunar infarcts  -CT head showing multiple lacunar infarcts of indeterminate age  -neurology following  -admitted to stroke unit  - aspirin 325 mg in the ED, now on aspirin 162 mg daily  -started atorvastatin 80 mg  -lipid profile, HbA1C  - s/p CTH, CTA, MRI, echo with bubble study  - Speech/PT/OT eval  - dysphagia 3 with thin liquid  - c/w IV hydration, NS @60    #COPD  -follows with Dr. Romario Maria  -c/w albuterol prn    #Hypokalemia, Hypochloremia 2/2 poor po intake - resolved  -potassium repleted  -follow up BMP  -cont with fluids    #Hyperbilirubinemia  -pt has history of cholecystectomy  -T bili, D bili and Alk Phos elevated  -rule out biliary obstruction vs intrahepatic cholestasis  -RUQ ultrasound performed    Diet dysphagia 3 with thin liquid  DVT ppx heparin  FULL CODE  Dispo: anticipated for tomrorow

## 2020-05-07 NOTE — PROGRESS NOTE ADULT - ASSESSMENT
A 59 years old right handed woman who presents with acute onset dizziness, vomiting and loss of balance for 1 week is found to have acute inferior right cerebellar peduncle and chronic infarctions of left BG and CR as wells as chronic lacunar infarctions within b/l deep gray nuclei and the central sabrina on Brain MRI. Etiology likely to be small vessel disease related. She is cerebrovascular risk factors including active smoking, PVD.     SUGGESTIONS:  - Routine neuro checks Q4h  - Increase ASA 81 to 162 mg daily and c/w high dose statin therapy   - Obtain inflammatory markers ESR, CRP, RF  - Encourage smoking cessation   - Encourage weight loss and aerobic exercise at least 30 min/day    - Maximize lifestyle/risk factor modifications  - Pt can follow up in Stroke Clinic or with her neurologist Dr. Whitaker   - Dispo: Pending Physiatry Consult and evaluation     Updated plan with primary team   Case discussed and patient seen with attending physician   Cherri Go NP  [id number] A 59 years old right handed woman who presents with acute onset dizziness, vomiting and loss of balance for 1 week is found to have acute inferior right cerebellar peduncle and chronic infarctions of left BG and CR as wells as chronic lacunar infarctions within b/l deep gray nuclei and the central sabrina on Brain MRI. Etiology likely to be small vessel disease related. She is cerebrovascular risk factors including active smoking, PVD.     SUGGESTIONS:  - Routine neuro checks Q4h  - Increase ASA 81 to 162 mg daily and c/w high dose statin therapy   - Obtain inflammatory markers ESR, CRP, RF  - Encourage smoking cessation   - Encourage weight loss and aerobic exercise at least 30 min/day    - Maximize lifestyle/risk factor modifications  - Avoid dehydration.  - Pt can follow up in Stroke Clinic or with her neurologist Dr. Whitaker   - Dispo: Pending Physiatry Consult and evaluation     Updated plan with primary team   Case discussed and patient seen with attending physician   Cherri Go NP  [id number]

## 2020-05-08 ENCOUNTER — TRANSCRIPTION ENCOUNTER (OUTPATIENT)
Age: 60
End: 2020-05-08

## 2020-05-08 VITALS
TEMPERATURE: 98 F | RESPIRATION RATE: 19 BRPM | SYSTOLIC BLOOD PRESSURE: 135 MMHG | DIASTOLIC BLOOD PRESSURE: 61 MMHG | HEART RATE: 84 BPM

## 2020-05-08 LAB
ALBUMIN SERPL ELPH-MCNC: 3.7 G/DL — SIGNIFICANT CHANGE UP (ref 3.5–5.2)
ALP SERPL-CCNC: 119 U/L — HIGH (ref 30–115)
ALT FLD-CCNC: 12 U/L — SIGNIFICANT CHANGE UP (ref 0–41)
ANION GAP SERPL CALC-SCNC: 12 MMOL/L — SIGNIFICANT CHANGE UP (ref 7–14)
AST SERPL-CCNC: 11 U/L — SIGNIFICANT CHANGE UP (ref 0–41)
BASOPHILS # BLD AUTO: 0.04 K/UL — SIGNIFICANT CHANGE UP (ref 0–0.2)
BASOPHILS NFR BLD AUTO: 0.5 % — SIGNIFICANT CHANGE UP (ref 0–1)
BILIRUB SERPL-MCNC: 2 MG/DL — HIGH (ref 0.2–1.2)
BUN SERPL-MCNC: 10 MG/DL — SIGNIFICANT CHANGE UP (ref 10–20)
CALCIUM SERPL-MCNC: 8.5 MG/DL — SIGNIFICANT CHANGE UP (ref 8.5–10.1)
CHLORIDE SERPL-SCNC: 101 MMOL/L — SIGNIFICANT CHANGE UP (ref 98–110)
CO2 SERPL-SCNC: 27 MMOL/L — SIGNIFICANT CHANGE UP (ref 17–32)
CREAT SERPL-MCNC: 0.6 MG/DL — LOW (ref 0.7–1.5)
CRP SERPL-MCNC: 0.57 MG/DL — HIGH (ref 0–0.4)
EOSINOPHIL # BLD AUTO: 0.12 K/UL — SIGNIFICANT CHANGE UP (ref 0–0.7)
EOSINOPHIL NFR BLD AUTO: 1.6 % — SIGNIFICANT CHANGE UP (ref 0–8)
ERYTHROCYTE [SEDIMENTATION RATE] IN BLOOD: 17 MM/HR — SIGNIFICANT CHANGE UP (ref 0–20)
GLUCOSE SERPL-MCNC: 139 MG/DL — HIGH (ref 70–99)
HCT VFR BLD CALC: 37.5 % — SIGNIFICANT CHANGE UP (ref 37–47)
HGB BLD-MCNC: 12.9 G/DL — SIGNIFICANT CHANGE UP (ref 12–16)
IMM GRANULOCYTES NFR BLD AUTO: 0.3 % — SIGNIFICANT CHANGE UP (ref 0.1–0.3)
LYMPHOCYTES # BLD AUTO: 2.48 K/UL — SIGNIFICANT CHANGE UP (ref 1.2–3.4)
LYMPHOCYTES # BLD AUTO: 32.8 % — SIGNIFICANT CHANGE UP (ref 20.5–51.1)
MCHC RBC-ENTMCNC: 29.4 PG — SIGNIFICANT CHANGE UP (ref 27–31)
MCHC RBC-ENTMCNC: 34.4 G/DL — SIGNIFICANT CHANGE UP (ref 32–37)
MCV RBC AUTO: 85.4 FL — SIGNIFICANT CHANGE UP (ref 81–99)
MONOCYTES # BLD AUTO: 0.56 K/UL — SIGNIFICANT CHANGE UP (ref 0.1–0.6)
MONOCYTES NFR BLD AUTO: 7.4 % — SIGNIFICANT CHANGE UP (ref 1.7–9.3)
NEUTROPHILS # BLD AUTO: 4.35 K/UL — SIGNIFICANT CHANGE UP (ref 1.4–6.5)
NEUTROPHILS NFR BLD AUTO: 57.4 % — SIGNIFICANT CHANGE UP (ref 42.2–75.2)
NRBC # BLD: 0 /100 WBCS — SIGNIFICANT CHANGE UP (ref 0–0)
PLATELET # BLD AUTO: 219 K/UL — SIGNIFICANT CHANGE UP (ref 130–400)
POTASSIUM SERPL-MCNC: 3.3 MMOL/L — LOW (ref 3.5–5)
POTASSIUM SERPL-SCNC: 3.3 MMOL/L — LOW (ref 3.5–5)
PROT SERPL-MCNC: 5.9 G/DL — LOW (ref 6–8)
RBC # BLD: 4.39 M/UL — SIGNIFICANT CHANGE UP (ref 4.2–5.4)
RBC # FLD: 12.9 % — SIGNIFICANT CHANGE UP (ref 11.5–14.5)
SODIUM SERPL-SCNC: 140 MMOL/L — SIGNIFICANT CHANGE UP (ref 135–146)
WBC # BLD: 7.57 K/UL — SIGNIFICANT CHANGE UP (ref 4.8–10.8)
WBC # FLD AUTO: 7.57 K/UL — SIGNIFICANT CHANGE UP (ref 4.8–10.8)

## 2020-05-08 PROCEDURE — 99239 HOSP IP/OBS DSCHRG MGMT >30: CPT

## 2020-05-08 RX ORDER — ASPIRIN/CALCIUM CARB/MAGNESIUM 324 MG
1 TABLET ORAL
Qty: 60 | Refills: 0
Start: 2020-05-08 | End: 2020-07-06

## 2020-05-08 RX ORDER — CLOPIDOGREL BISULFATE 75 MG/1
1 TABLET, FILM COATED ORAL
Qty: 60 | Refills: 0
Start: 2020-05-08 | End: 2020-07-06

## 2020-05-08 RX ORDER — CILOSTAZOL 100 MG/1
1 TABLET ORAL
Qty: 60 | Refills: 0
Start: 2020-05-08 | End: 2020-06-06

## 2020-05-08 RX ORDER — POTASSIUM CHLORIDE 20 MEQ
40 PACKET (EA) ORAL ONCE
Refills: 0 | Status: COMPLETED | OUTPATIENT
Start: 2020-05-08 | End: 2020-05-08

## 2020-05-08 RX ORDER — ASPIRIN/CALCIUM CARB/MAGNESIUM 324 MG
81 TABLET ORAL DAILY
Refills: 0 | Status: DISCONTINUED | OUTPATIENT
Start: 2020-05-08 | End: 2020-05-08

## 2020-05-08 RX ORDER — METFORMIN HYDROCHLORIDE 850 MG/1
1 TABLET ORAL
Qty: 60 | Refills: 0
Start: 2020-05-08 | End: 2020-07-06

## 2020-05-08 RX ORDER — CILOSTAZOL 100 MG/1
100 TABLET ORAL EVERY 12 HOURS
Refills: 0 | Status: DISCONTINUED | OUTPATIENT
Start: 2020-05-08 | End: 2020-05-08

## 2020-05-08 RX ORDER — ATORVASTATIN CALCIUM 80 MG/1
1 TABLET, FILM COATED ORAL
Qty: 60 | Refills: 0
Start: 2020-05-08 | End: 2020-07-06

## 2020-05-08 RX ADMIN — CHLORHEXIDINE GLUCONATE 1 APPLICATION(S): 213 SOLUTION TOPICAL at 05:30

## 2020-05-08 RX ADMIN — HEPARIN SODIUM 5000 UNIT(S): 5000 INJECTION INTRAVENOUS; SUBCUTANEOUS at 05:31

## 2020-05-08 RX ADMIN — Medication 40 MILLIEQUIVALENT(S): at 10:26

## 2020-05-08 RX ADMIN — HEPARIN SODIUM 5000 UNIT(S): 5000 INJECTION INTRAVENOUS; SUBCUTANEOUS at 14:54

## 2020-05-08 RX ADMIN — Medication 162 MILLIGRAM(S): at 12:27

## 2020-05-08 NOTE — DISCHARGE NOTE PROVIDER - PROVIDER TOKENS
PROVIDER:[TOKEN:[[id number]],FOLLOWUP:[2 weeks]],PROVIDER:[TOKEN:[[id number]],FOLLOWUP:[1 month]] PROVIDER:[TOKEN:[[id number]],FOLLOWUP:[2 weeks]],PROVIDER:[TOKEN:[[id number]],FOLLOWUP:[2 weeks]],PROVIDER:[TOKEN:[[id number]],FOLLOWUP:[1 week]]

## 2020-05-08 NOTE — PROGRESS NOTE ADULT - SUBJECTIVE AND OBJECTIVE BOX
STROKE PROGRESS NOTE    Chief Complaint: dizziness/gait instability/N/V    HPI: This is a 59 years old right handed woman with past medical history of COPD, PVD, current everyday smoker, presents to ED for dizziness and weakness that started about 1 week ago. She states she feels like the room is spinning and she is so dizzy she has been unable to walk. Associated nausea but no vomiting. On Neurological exam, she is with +dysmetria on the right. CTH with multiple lacunar infarcts of different chronicities.  No cough, SOB, fever or chills. Associated urinary frequency but no abdominal pain.   Patient has chronic pain and discoloration in her lower extremities from a past accident.     INTERVAL HISTORY: No overnight events and no tele events. This morning, patient while walking was nauseous.     Relevant PMH:  [] Prior ischemic stroke/TIA  [] Afib  []CAD  []HTN  []DLD  []DM [x]PVD []Obesity [] Sedintary lifestyle []CHF  []DARION COPD  []Cancer Hx     Social History: [] Smoking [x]  Drug Use: []   Alcohol Use:   [] Other:      Possible Location of Stroke: Inferior right middle cerebellar peduncle    Possible Cause of Stroke: Likely small vessel disease related    Relevant Cerebral Imaging:  < from: MR Head No Cont (05.06.20 @ 18:58) >  IMPRESSION:   1.  Small focus of restricted diffusion along the medial margin of the right middle cerebellar peduncle. Diagnostic consideration include acute lacunar infarct as well as demyelination in this location.  2.  Chronic infarct in the left basal ganglia/corona radiata and multiple chronic lacunar infarcts within bilateral deep gray nuclei and the central sabrina.    < from: CT Head No Cont (05.06.20 @ 12:31) >  IMPRESSION:   1.  No evidence of acute intracranial hemorrhage, mass effect or midline shift.  2.  Scattered lacunar infarcts of indeterminate age. MRI brain may be obtained for further evaluation as clinically warranted.    Relevant Cervicocerebral Imaging:  < from: CT Angio Neck w/ IV Cont (05.06.20 @ 17:57) >  Findings:  NECK:  A visualized aortic arch and the greatvessel origins demonstrate calcific plaque without significant stenosis. There is a shared origin of the brachiocephalic and left common carotid arteries.  There are scattered calcific plaque involving the common carotid arteries and the carotid bifurcations without significant stenosis.  The vertebral arteries are patent.    HEAD:  There are long segment atheromatous changes involving the precavernous and cavernous segments of the internal carotid arteries with moderate bilateral stenoses. The anterior and middle cerebral arteries are patent.  The distal segments of the vertebral arteries are patent and the basilar artery are patent. There are moderate stenoses of the left PCA involving the mid-distal P1 segment and proximal P2 segment.    OTHER:  Bilateral centrilobular emphysematous changes of the lungs.   Bilateral dependent peripheral lung reticulations, likely representing atelectasis.  Degenerative changes of the cervical spine.  Multiple missing teeth with remaining teeth demonstrating periapical lucencies.    IMPRESSION:  1.  No evidence of acute large vessel occlusion.  2.  CTA neck: Scattered calcific plaque without significant stenosis.  3.  CTA head: Calcific plaque of the precavernous and cavernous segments of the internal carotid arteries with moderate bilateral stenoses. Moderate stenoses of the left PCA involving the mid/distal P1 and proximal P2 segments.  4.  Pulmonary emphysema.    Relevant blood tests:    Sedimentation Rate, Erythrocyte (05.08.20 @ 05:44)    Sedimentation Rate, Erythrocyte: 17 mm/Hr                        12.9   7.57  )-----------( 219      ( 08 May 2020 05:44 )             37.5       05-08    140  |  101  |  10  ----------------------------<  139<H>  3.3<L>   |  27  |  0.6<L>    Ca    8.5      08 May 2020 05:44    Lipid Profile (05.06.20 @ 21:14)    Total Cholesterol/HDL Ratio Measurement: 6.6 Ratio    Cholesterol, Serum: 166 mg/dL    Triglycerides, Serum: 144 mg/dL    HDL Cholesterol, Serum: 25: HDL Levels >/= 60 mg/dL are considered beneficial and a "negative" risk  factor.  Effective 08/15/2018: New reference range and interpretive comment. mg/dL    Direct LDL: 110: LDL Cholesterol (mg/dL) --- Interpretive Comment (for adults 18 and over)    A1C with Estimated Average Glucose (05.06.20 @ 21:14)    A1C with Estimated Average Glucose Result: 7.0: Method: Immunoassay    Relevant cardiac rhythm monitoring:   < from: 12 Lead ECG (05.06.20 @ 11:02) >  Ventricular Rate 73 BPM  Atrial Rate 227 BPM  QRS Duration 86 ms  Q-T Interval 386 ms  QTC Calculation(Bezet) 425 ms  P Axis 70 degrees  R Axis 65 degrees  T Axis 252 degrees  Diagnosis Line Sinus rhythm  T wave abnormality, consider inferior ischemia  T wave abnormality, consider anterolateral ischemia  Abnormal ECG    Relevant Cardiac Structure:(TTE/ARNOLD +/-):[]No intracardiac thrombus/[] no vegetation/[]no akynesia/EF:  < from: Transthoracic Echocardiogram (05.07.20 @ 07:27) >  Summary:   1. LV Ejection Fraction by Hogue's Method with a biplane EF of 68 %.   2. Mildly increased LV wall thickness.   3. Spectral Doppler shows impaired relaxation pattern of left ventricular myocardial filling (Grade I diastolic dysfunction).   4. Color flow doppler and intravenous injection of agitated saline demonstrates the presence of an intact intra atrial septum.    PHYSICIAN INTERPRETATION:  Left Ventricle: The left ventricular internal cavity size is normal. Left ventricular wall thickness is mildly increased. Spectral Doppler shows impaired relaxation pattern of left ventricular myocardial filling (Grade I diastolic dysfunction).  Right Ventricle: Normal right ventricular size and function.  Left Atrium: Normal left atrial size. Color flow doppler and intravenous injection of agitated saline demonstrates the presence of an intact intra atrial septum.  Right Atrium: Normal right atrial size.  Pericardium: Trivial pericardial effusion is present.  Mitral Valve: Structurally normal mitral valve, with normal leaflet excursion. The mitral valve is normal in structure. No evidence of mitral valve regurgitation is seen.  Tricuspid Valve: Structurally normal tricuspid valve, with normal leaflet excursion. The tricuspid valve is normal in structure. Trivial tricuspid regurgitation is visualized.  Aortic Valve: Normal trileaflet aortic valve with normal opening. The aortic valve is normal. No evidence of aortic valve regurgitation is seen.  Pulmonic Valve: Structurally normal pulmonic valve, with normal leaflet excursion. The pulmonic valve is normal. No indication of pulmonic valve regurgitation.  Aorta: The aortic root and ascending aorta are structurally normal, with no evidence of dilitation.  Pulmonary Artery: The main pulmonary artery is normal in size. Normal pulmonary artery pressure.  Shunts: Agitated saline contrast was given intravenously to evaluate for intracardiac shunting.    MEDICATIONS  (STANDING):  aspirin enteric coated 162 milliGRAM(s) Oral daily  atorvastatin 80 milliGRAM(s) Oral at bedtime  chlorhexidine 4% Liquid 1 Application(s) Topical <User Schedule>  heparin   Injectable 5000 Unit(s) SubCutaneous every 8 hours    PT/OT/Speech/Rehab/S&Swr: Completed    Exam:  Vital Signs Last 24 Hrs  T(C): 35.7 (05-08-20 @ 05:02), Max: 36.9 (05-07-20 @ 14:17)  T(F): 96.2 (05-08-20 @ 05:02), Max: 98.5 (05-07-20 @ 14:17)  HR: 67 (05-08-20 @ 05:02) (62 - 85)  BP: 152/65 (05-08-20 @ 05:02) (140/63 - 158/68)  BP(mean): --  RR: 18 (05-08-20 @ 05:02) (18 - 18)  SpO2: 98% (05-08-20 @ 01:57) (97% - 98%)    Neurologic Exam:  Mental status: Awake, alert and oriented x 3. Attention and concentration intact. Fund of knowledge appropriate.  Language: Naming, repetition, fluency, and comprehension intact. No dysarthria, no aphasia.  Cranial nerves: Pupils equally round and reactive to light, visual fields full, no nystagmus, extraocular muscles intact, V1 through V3 intact bilaterally and symmetric, face symmetric  Motor:  Normal bulk and tone, strength 5/5 in bilateral upper and lower extremities.   strength 5/5.  Rapid alternating movements intact and symmetric.   Sensation: Intact to light touch, proprioception, and pinprick.  No neglect.   Coordination: + Dysmetria on finger-to-nose  Ext: mottling in b/l feet     NIH STROKE SCALE  Item	                                                        Score  1 a.	Level of Consciousness	               	0  1 b. LOC Questions	                                    0  1 c.	LOC Commands	                               	0  2.	Best Gaze	                                    0  3.	Visual	                                                0  4.	Facial Palsy	                                    0  5 a.	Motor Arm - Left	                        0  5 b.	Motor Arm - Right	                        0  6 a.	Motor Leg - Left	                        0  6 b.	Motor Leg - Right	                        0  7.	Limb Ataxia	                                    1  8.	Sensory	                                                0  9.	Language	                                    0  10.	Dysarthria	                                    0  11.	Extinction and Inattention  	            0  ___________________________________________________________________  NIHSS on admission:  2        NIHSS yesterday:     2     NIHSS today: 1      mRS: 3 ->2

## 2020-05-08 NOTE — DISCHARGE NOTE PROVIDER - CARE PROVIDER_API CALL
Jimmie Whitaker)  Neurology  [address] [place] [address]  Phone: [phone]  Fax: [phone]  Follow Up Time: 2 weeks    Josh Ramirez)  Neurology; Vascular Neurology  [address] [place] [address], [address] [id number]  Phone: [phone]  Fax: [phone]  Follow Up Time: 1 month Jimmie Whitaker)  Neurology  [address] [place] [address]  Phone: [phone]  Fax: [phone]  Follow Up Time: 2 weeks    Josh Ramirez)  Neurology; Vascular Neurology  [address] [place] [address], [facility] [address]  [place], [place] [id number]  Phone: [phone]  Fax: [phone]  Follow Up Time: 2 weeks    Wayne Corbin)  Internal Medicine  [address] [place] [address]  Phone: [phone]  Fax: [phone]  Follow Up Time: 1 week

## 2020-05-08 NOTE — DISCHARGE NOTE PROVIDER - NSDCCPCAREPLAN_GEN_ALL_CORE_FT
PRINCIPAL DISCHARGE DIAGNOSIS  Diagnosis: Dizziness  Assessment and Plan of Treatment: You were found to have a stroke. Please take your medications as prescribed. Please stop smoking and start daily exercise for 30 minutes. Please follow up outpatient.      SECONDARY DISCHARGE DIAGNOSES  Diagnosis: Difficulty balancing  Assessment and Plan of Treatment: PRINCIPAL DISCHARGE DIAGNOSIS  Diagnosis: Acute cerebrovascular accident (CVA)  Assessment and Plan of Treatment: You were found to have an acute stroke. Please take your medications as prescribed. Please stop smoking and start daily exercise for 30 minutes. Please follow up outpatient with PMD and stroke neurologist.      SECONDARY DISCHARGE DIAGNOSES  Diagnosis: DM (diabetes mellitus)  Assessment and Plan of Treatment: work on weight loss, exercise

## 2020-05-08 NOTE — DISCHARGE NOTE PROVIDER - NSDCMRMEDTOKEN_GEN_ALL_CORE_FT
albuterol 90 mcg/inh inhalation aerosol: 2 puffs as needed  aspirin 81 mg oral delayed release tablet: 1 tab(s) orally once a day   atorvastatin 80 mg oral tablet: 1 tab(s) orally once a day (at bedtime)  clopidogrel 75 mg oral tablet: 1 tab(s) orally once a day   metFORMIN 500 mg oral tablet: 1 tab(s) orally once a day albuterol 90 mcg/inh inhalation aerosol: 2 puffs as needed  aspirin 81 mg oral delayed release tablet: 1 tab(s) orally once a day   atorvastatin 80 mg oral tablet: 1 tab(s) orally once a day (at bedtime)  cilostazol 100 mg oral tablet: 1 tab(s) orally every 12 hours  metFORMIN 500 mg oral tablet: 1 tab(s) orally once a day

## 2020-05-08 NOTE — DISCHARGE NOTE NURSING/CASE MANAGEMENT/SOCIAL WORK - PATIENT PORTAL LINK FT
You can access the FollowMyHealth Patient Portal offered by [facility] by registering at the following website: [email or web address]. By joining [employer]’s FollowMyHealth portal, you will also be able to view your health information using other applications (apps) compatible with our system.

## 2020-05-08 NOTE — SWALLOW BEDSIDE ASSESSMENT ADULT - SLP PERTINENT HISTORY OF CURRENT PROBLEM
Pt admitted with vomiting, dizziness x1 week. CT head revealed scattered lacunar infarcts (of indeterminate age). PMHx: COPD
Pt admitted with vomiting, dizziness x1 week. CT head revealed scattered lacunar infarcts (of indeterminate age). PMHx: COPD

## 2020-05-08 NOTE — PROGRESS NOTE ADULT - ASSESSMENT
A 59 years old right handed woman who presents with acute onset dizziness, vomiting and loss of balance for 1 week is found to have acute inferior right cerebellar peduncle and chronic infarctions of left BG and CR as wells as chronic lacunar infarctions within b/l deep gray nuclei and the central sabrina on Brain MRI. Etiology likely to be small vessel disease related. She is cerebrovascular risk factors including active smoking, PVD.     SUGGESTIONS:  - Routine neuro checks Q4h  - Start DAPT with either ASA 81 mg + Plavix 75 mg OR cilostazol + ASA or Plavix  - Pending inflammatory markers ESR, CRP, RF  - Encourage smoking cessation   - Encourage weight loss and aerobic exercise at least 30 min/day    - Maximize lifestyle/risk factor modifications  - Encourage aggressive hydration at least 3L/day  - Pt can follow up in Stroke Clinic or with her neurologist Dr. Whitaker   - Dispo: Per pt's request Sister's home    Updated plan with primary team   Case discussed and patient seen with attending physician   Cherri Go NP  [id number] A 59 years old right handed woman who presents with acute onset dizziness, vomiting and loss of balance for 1 week is found to have acute inferior right cerebellar peduncle and chronic infarctions of left BG and CR as wells as chronic lacunar infarctions within b/l deep gray nuclei and the central sabrina on Brain MRI. Etiology likely to be small vessel disease related. She is cerebrovascular risk factors including active smoking, PVD.     SUGGESTIONS:  - Routine neuro checks Q4h  - Start Cilostazol + ASA 81.  - Pending inflammatory markers ESR, CRP, RF.  - Encourage smoking cessation.   - Encourage weight loss and aerobic exercise at least 30 min/day.    - Maximize lifestyle/risk factor modifications.  - Encourage aggressive hydration at least 3L/day.  - Pt can follow up in Stroke Clinic or with her neurologist Dr. Whitaker   - Dispo: Per Rehab team

## 2020-05-08 NOTE — PROGRESS NOTE ADULT - SUBJECTIVE AND OBJECTIVE BOX
Patient is a 59y old  Female who presents with a chief complaint of lacunar strokes on CTH (08 May 2020 11:55)    INTERVAL HPI/OVERNIGHT EVENTS: no complaints, feels better  ROS: Denies CP, SOB, AP, new weakness  All other systems reviewed and are within normal limits.  InitialHPI:  59 year old F with PMH of COPD (current smoker) who presents to ED for dizziness and vomiting that began last Thursday. She reports being unable to ambulate due to the dizziness.  She has also had persistent nausea and two episodes of vomiting this past week.  She has never had symptoms like this before.  She denies any SOB, chest pain, fevers/chills, abdominal pain, or cough.  She does endorse decreased PO intake over the past week due to the nausea and vomiting.      In the ED, vitals stable. Labs significant for hypokalemia and hypochloremia.  CT head revealed scattered lacunar infarcts (of indeterminate age). (06 May 2020 16:46)    DIZZINESS DIFFICULTY BALANCING  ^WEAKNESS  No pertinent family history in first degree relatives  Handoff  MEWS Score  PVD (peripheral vascular disease)  COPD (chronic obstructive pulmonary disease)  Dizziness  No significant past surgical history  WEAKNESS  Difficulty balancing    PAST MEDICAL & SURGICAL HISTORY:  COPD (chronic obstructive pulmonary disease)  No significant past surgical history      General: NAD, AAO3  HEENT:  EOMI, no LAD  CV: S1 S2  Resp: decreased breath sounds at bases  GI: NT/ND/S +BS  MS: no clubbing/cyanosis/edema, 2+ pulses b/l  Neuro: nonfocal, 2+reflexes thruout    MEDICATIONS  (STANDING):  aspirin enteric coated 162 milliGRAM(s) Oral daily  atorvastatin 80 milliGRAM(s) Oral at bedtime  chlorhexidine 4% Liquid 1 Application(s) Topical <User Schedule>  heparin   Injectable 5000 Unit(s) SubCutaneous every 8 hours    MEDICATIONS  (PRN):    Home Medications:  albuterol 90 mcg/inh inhalation aerosol: 2 puffs as needed (06 May 2020 17:07)    Vital Signs Last 24 Hrs  T(C): 35.7 (08 May 2020 05:02), Max: 36.9 (07 May 2020 14:17)  T(F): 96.2 (08 May 2020 05:02), Max: 98.5 (07 May 2020 14:17)  HR: 67 (08 May 2020 05:02) (62 - 85)  BP: 152/65 (08 May 2020 05:02) (140/63 - 158/68)  BP(mean): --  RR: 18 (08 May 2020 05:02) (18 - 18)  SpO2: 98% (08 May 2020 01:57) (97% - 98%)  CAPILLARY BLOOD GLUCOSE        LABS:                        12.9   7.57  )-----------( 219      ( 08 May 2020 05:44 )             37.5     05-08    140  |  101  |  10  ----------------------------<  139<H>  3.3<L>   |  27  |  0.6<L>    Ca    8.5      08 May 2020 05:44    TPro  5.9<L>  /  Alb  3.7  /  TBili  2.0<H>  /  DBili  x   /  AST  11  /  ALT  12  /  AlkPhos  119<H>  05-08    LIVER FUNCTIONS - ( 08 May 2020 05:44 )  Alb: 3.7 g/dL / Pro: 5.9 g/dL / ALK PHOS: 119 U/L / ALT: 12 U/L / AST: 11 U/L / GGT: x                 Urinalysis Basic - ( 06 May 2020 22:13 )    Color: Yellow / Appearance: Clear / SG: >1.050 / pH: x  Gluc: x / Ketone: Large  / Bili: Negative / Urobili: 3 mg/dL   Blood: x / Protein: 30 mg/dL / Nitrite: Negative   Leuk Esterase: Negative / RBC: 3 /HPF / WBC 3 /HPF   Sq Epi: x / Non Sq Epi: 4 /HPF / Bacteria: Negative              Culture - Urine (collected 06 May 2020 22:13)  Source: .Urine Clean Catch (Midstream)  Final Report (07 May 2020 22:52):    >=3 organisms. Probable collection contamination.      Chart, Consultant(s) Notes Reviewed:  [x ] YES  [ ] NO  Care Discussed with Consultants/Other Providers/ Housestaff [ x] YES  [ ] NO  Radiology, labs, old available records personally reviewed. Patient is a 59y old  Female who presents with a chief complaint of lacunar strokes on CTH (08 May 2020 11:55)    INTERVAL HPI/OVERNIGHT EVENTS: no complaints, feels better  ROS: Denies CP, SOB, AP, new weakness  All other systems reviewed and are within normal limits.  InitialHPI:  59 year old F with PMH of COPD (current smoker) who presents to ED for dizziness and vomiting that began last Thursday. She reports being unable to ambulate due to the dizziness.  She has also had persistent nausea and two episodes of vomiting this past week.  She has never had symptoms like this before.  She denies any SOB, chest pain, fevers/chills, abdominal pain, or cough.  She does endorse decreased PO intake over the past week due to the nausea and vomiting.      In the ED, vitals stable. Labs significant for hypokalemia and hypochloremia.  CT head revealed scattered lacunar infarcts (of indeterminate age). (06 May 2020 16:46)    DIZZINESS DIFFICULTY BALANCING  ^WEAKNESS  No pertinent family history in first degree relatives  Handoff  MEWS Score  PVD (peripheral vascular disease)  COPD (chronic obstructive pulmonary disease)  Dizziness  No significant past surgical history  WEAKNESS  Difficulty balancing    PAST MEDICAL & SURGICAL HISTORY:  COPD (chronic obstructive pulmonary disease)  No significant past surgical history      General: NAD, AAO3  CV: S1 S2  Resp: decreased breath sounds at bases  GI: NT/ND/S +BS  MS: no clubbing/cyanosis/edema, 2+ pulses b/l  Neuro: Rt sided dysmetira    MEDICATIONS  (STANDING):  aspirin enteric coated 162 milliGRAM(s) Oral daily  atorvastatin 80 milliGRAM(s) Oral at bedtime  chlorhexidine 4% Liquid 1 Application(s) Topical <User Schedule>  heparin   Injectable 5000 Unit(s) SubCutaneous every 8 hours    MEDICATIONS  (PRN):    Home Medications:  albuterol 90 mcg/inh inhalation aerosol: 2 puffs as needed (06 May 2020 17:07)    Vital Signs Last 24 Hrs  T(C): 35.7 (08 May 2020 05:02), Max: 36.9 (07 May 2020 14:17)  T(F): 96.2 (08 May 2020 05:02), Max: 98.5 (07 May 2020 14:17)  HR: 67 (08 May 2020 05:02) (62 - 85)  BP: 152/65 (08 May 2020 05:02) (140/63 - 158/68)  BP(mean): --  RR: 18 (08 May 2020 05:02) (18 - 18)  SpO2: 98% (08 May 2020 01:57) (97% - 98%)  CAPILLARY BLOOD GLUCOSE        LABS:                        12.9   7.57  )-----------( 219      ( 08 May 2020 05:44 )             37.5     05-08    140  |  101  |  10  ----------------------------<  139<H>  3.3<L>   |  27  |  0.6<L>    Ca    8.5      08 May 2020 05:44    TPro  5.9<L>  /  Alb  3.7  /  TBili  2.0<H>  /  DBili  x   /  AST  11  /  ALT  12  /  AlkPhos  119<H>  05-08    LIVER FUNCTIONS - ( 08 May 2020 05:44 )  Alb: 3.7 g/dL / Pro: 5.9 g/dL / ALK PHOS: 119 U/L / ALT: 12 U/L / AST: 11 U/L / GGT: x                 Urinalysis Basic - ( 06 May 2020 22:13 )    Color: Yellow / Appearance: Clear / SG: >1.050 / pH: x  Gluc: x / Ketone: Large  / Bili: Negative / Urobili: 3 mg/dL   Blood: x / Protein: 30 mg/dL / Nitrite: Negative   Leuk Esterase: Negative / RBC: 3 /HPF / WBC 3 /HPF   Sq Epi: x / Non Sq Epi: 4 /HPF / Bacteria: Negative              Culture - Urine (collected 06 May 2020 22:13)  Source: .Urine Clean Catch (Midstream)  Final Report (07 May 2020 22:52):    >=3 organisms. Probable collection contamination.      Chart, Consultant(s) Notes Reviewed:  [x ] YES  [ ] NO  Care Discussed with Consultants/Other Providers/ Housestaff [ x] YES  [ ] NO  Radiology, labs, old available records personally reviewed.

## 2020-05-08 NOTE — DISCHARGE NOTE NURSING/CASE MANAGEMENT/SOCIAL WORK - NSDCPEPTSTRK_GEN_ALL_CORE
Call 911 for stroke/Need for follow up after discharge/Prescribed medications/Stroke education booklet/Stroke support groups for patients, families, and friends/Risk factors for stroke/Stroke warning signs and symptoms/Signs and symptoms of stroke

## 2020-05-08 NOTE — PROGRESS NOTE ADULT - NSHPATTENDINGPLANDISCUSS_GEN_ALL_CORE
our NV and Medicine team during the Stroke Unit Multidiciplinary round. Also was discussed with the patient.

## 2020-05-08 NOTE — CONSULT NOTE ADULT - SUBJECTIVE AND OBJECTIVE BOX
Patient is a 59y old  Female who presents with a chief complaint of dizziness (08 May 2020 10:52)    HPI:  59 year old F with PMH of COPD (current smoker) who presents to ED for dizziness and vomiting that began last Thursday. She reports being unable to ambulate due to the dizziness.  She has also had persistent nausea and two episodes of vomiting this past week.  She has never had symptoms like this before.  She denies any SOB, chest pain, fevers/chills, abdominal pain, or cough.  She does endorse decreased PO intake over the past week due to the nausea and vomiting.      In the ED, vitals stable. Labs significant for hypokalemia and hypochloremia.  CT head revealed scattered lacunar infarcts (of indeterminate age). (06 May 2020 16:46)      PAST MEDICAL & SURGICAL HISTORY:  COPD (chronic obstructive pulmonary disease)  No significant past surgical history      Hospital Course: Dizziness, imbalance, nausea/vomiting- likely due to lacunar infarcts  -CT head showing multiple lacunar infarcts of indeterminate age  -neurology following  -admitted to stroke unit  - aspirin 325 mg in the ED, now on aspirin 162 mg daily  -started atorvastatin 80 mg  -lipid profile, HbA1C  - s/p CTH, CTA, MRI, echo with bubble study  - Speech/PT/OT eval  - dysphagia 3 with thin liquid  - c/w IV hydration, NS @60    #COPD  -follows with Dr. Romario Maria  -c/w albuterol prn    #Hypokalemia, Hypochloremia 2/2 poor po intake - resolved  -potassium repleted  -follow up BMP  -cont with fluids    #Hyperbilirubinemia  -pt has history of cholecystectomy  -T bili, D bili and Alk Phos elevated  -rule out biliary obstruction vs intrahepatic cholestasis  -RUQ ultrasound performed    Diet dysphagia 3 with thin liquid  DVT ppx heparin  FULL CODE    TODAY'S SUBJECTIVE & REVIEW OF SYMPTOMS:     Constitutional dizziness/ diminished balance   Cardio WNL   Resp WNL   GI WNL  Heme WNL  Endo WNL  Skin WNL  MSK WNL  Neuro WNL  Cognitive WNL  Psych WNL      MEDICATIONS  (STANDING):  aspirin enteric coated 162 milliGRAM(s) Oral daily  atorvastatin 80 milliGRAM(s) Oral at bedtime  chlorhexidine 4% Liquid 1 Application(s) Topical <User Schedule>  heparin   Injectable 5000 Unit(s) SubCutaneous every 8 hours    MEDICATIONS  (PRN):      FAMILY HISTORY:  No pertinent family history in first degree relatives      Allergies    codeine (Rash)    Intolerances        SOCIAL HISTORY:    [    ] Etoh  [ x   ] Smoking  [    ] Substance abuse     Home Environment:  [  x  ] Home Alone  [    ] Lives with Family  [    ] Home Health Aid    Dwelling:  [    ] Apartment  [  x  ] Private House  [    ] Adult Home  [    ] Skilled Nursing Facility      [    ] Short Term  [    ] Long Term  [  x  ] Stairs                           [    ] Elevator     FUNCTIONAL STATUS PTA: (Check all that apply)  Ambulation: [   x  ]Independent    [    ] Dependent     [    ] Non-Ambulatory  Assistive Device: [  x  ] SA Cane  [    ]  Q Cane  [    ] Walker  [    ]  Wheelchair  ADL : [  x  ] Independent  [    ]  Dependent       Vital Signs Last 24 Hrs  T(C): 35.7 (08 May 2020 05:02), Max: 36.9 (07 May 2020 14:17)  T(F): 96.2 (08 May 2020 05:02), Max: 98.5 (07 May 2020 14:17)  HR: 67 (08 May 2020 05:02) (62 - 85)  BP: 152/65 (08 May 2020 05:02) (140/63 - 158/68)  BP(mean): --  RR: 18 (08 May 2020 05:02) (18 - 18)  SpO2: 98% (08 May 2020 01:57) (97% - 98%)      PHYSICAL EXAM: Alert & Oriented X3  GENERAL: NAD, well-groomed, well-developed  HEAD:  Atraumatic, Normocephalic  EYES: EOMI, PERRLA, conjunctiva and sclera clear  NECK: Supple  CHEST/LUNG: Clear bilaterally  HEART: Regular rate and rhythm  ABDOMEN: Soft, Nontender, Nondistended; Bowel sounds present  EXTREMITIES:  no calf tenderness,no edema BLES    NERVOUS SYSTEM:  Cranial Nerves 2-12 intact [  x  ] Abnormal  [    ]  ROM: WFL all extremities [   x ]  Abnormal [     ]  Motor Strength: WFL all extremities  [  x  ]  Abnormal [    ]  Sensation: intact to light touch [  x  ] Abnormal [    ]  Mild drift RUE    FUNCTIONAL STATUS:  Bed Mobility: [   ]  Independent [    ]  Supervision [  x  ]  Needs Assistance [  ]  N/A  Transfers: [    ]  Independent [    ]  Supervision [  x  ]  Needs Assistance [    ]  N/A    Ambulation:  [    ]  Independent [    ]  Supervision [x    ]  Needs Assistance [    ]  N/A   ADL:  [    ]   Independent [  x  ] Requires Assistance [    ] N/A   MIN A TRANSFERS, MIN A RW 25' + ATAXIA    LABS:                        12.9   7.57  )-----------( 219      ( 08 May 2020 05:44 )             37.5     05-08    140  |  101  |  10  ----------------------------<  139<H>  3.3<L>   |  27  |  0.6<L>    Ca    8.5      08 May 2020 05:44    TPro  5.9<L>  /  Alb  3.7  /  TBili  2.0<H>  /  DBili  x   /  AST  11  /  ALT  12  /  AlkPhos  119<H>  05-08      Urinalysis Basic - ( 06 May 2020 22:13 )    Color: Yellow / Appearance: Clear / SG: >1.050 / pH: x  Gluc: x / Ketone: Large  / Bili: Negative / Urobili: 3 mg/dL   Blood: x / Protein: 30 mg/dL / Nitrite: Negative   Leuk Esterase: Negative / RBC: 3 /HPF / WBC 3 /HPF   Sq Epi: x / Non Sq Epi: 4 /HPF / Bacteria: Negative        RADIOLOGY & ADDITIONAL STUDIES:

## 2020-05-08 NOTE — SWALLOW BEDSIDE ASSESSMENT ADULT - SWALLOW EVAL: DIAGNOSIS
No s/s aspiration/penetration for thin liquids, puree, mechanical soft. Mild oral dysphagia for mechanical soft
+toleration of Dysphagia Diet III Mechanical soft consistency with cut up meats, thins Mild oral dysphagia for regular

## 2020-05-08 NOTE — DISCHARGE NOTE PROVIDER - HOSPITAL COURSE
on admission: 59 year old F with PMH of COPD (current smoker) who presents to ED for dizziness and vomiting that began last Thursday. She reports being unable to ambulate due to the dizziness.  She has also had persistent nausea and two episodes of vomiting this past week.  She has never had symptoms like this before.  She denies any SOB, chest pain, fevers/chills, abdominal pain, or cough.  She does endorse decreased PO intake over the past week due to the nausea and vomiting.          In the ED, vitals stable. Labs significant for hypokalemia and hypochloremia.    CT head revealed scattered lacunar infarcts (of indeterminate age).        In the stroke unit: Pt maintains significant limb ataxia. Pt has R cerebellar peduncle infarct and multiple chronic lacunar infarcts. Currently thought to be atherosclerotic in origin. Pt will be sent to sister's home with home care, with metformin, rolling walker, and commode. Pt will go home on dual antiplatelet. on admission: 59 year old F with PMH of COPD (current smoker) who presents to ED for dizziness and vomiting that began last Thursday. She reports being unable to ambulate due to the dizziness.  She has also had persistent nausea and two episodes of vomiting this past week.  She has never had symptoms like this before.  She denies any SOB, chest pain, fevers/chills, abdominal pain, or cough.  She does endorse decreased PO intake over the past week due to the nausea and vomiting.          In the ED, vitals stable. Labs significant for hypokalemia and hypochloremia.    CT head revealed scattered lacunar infarcts (of indeterminate age).        In the stroke unit: Pt maintains significant limb ataxia. Pt has R cerebellar peduncle infarct and multiple chronic lacunar infarcts. Currently thought to be atherosclerotic in origin. Pt will be sent to sister's home with home care, with metformin, rolling walker, and commode. Pt will go home on aspirin and pletal as per neuro.

## 2020-05-08 NOTE — DISCHARGE NOTE PROVIDER - CARE PROVIDERS DIRECT ADDRESSES
,DirectAddress_Unknown,[email or web address] ,DirectAddress_Unknown,[email or web address]

## 2020-05-09 LAB — RHEUMATOID FACT SERPL-ACNC: 11 IU/ML — SIGNIFICANT CHANGE UP (ref 0–13)

## 2020-05-11 DIAGNOSIS — E11.9 TYPE 2 DIABETES MELLITUS WITHOUT COMPLICATIONS: ICD-10-CM

## 2020-05-11 DIAGNOSIS — F17.210 NICOTINE DEPENDENCE, CIGARETTES, UNCOMPLICATED: ICD-10-CM

## 2020-05-11 DIAGNOSIS — E83.39 OTHER DISORDERS OF PHOSPHORUS METABOLISM: ICD-10-CM

## 2020-05-11 DIAGNOSIS — I67.89 OTHER CEREBROVASCULAR DISEASE: ICD-10-CM

## 2020-05-11 DIAGNOSIS — I67.2 CEREBRAL ATHEROSCLEROSIS: ICD-10-CM

## 2020-05-11 DIAGNOSIS — E80.7 DISORDER OF BILIRUBIN METABOLISM, UNSPECIFIED: ICD-10-CM

## 2020-05-11 DIAGNOSIS — J98.11 ATELECTASIS: ICD-10-CM

## 2020-05-11 DIAGNOSIS — I63.81 OTHER CEREBRAL INFARCTION DUE TO OCCLUSION OR STENOSIS OF SMALL ARTERY: ICD-10-CM

## 2020-05-11 DIAGNOSIS — E87.6 HYPOKALEMIA: ICD-10-CM

## 2020-05-11 DIAGNOSIS — E87.8 OTHER DISORDERS OF ELECTROLYTE AND FLUID BALANCE, NOT ELSEWHERE CLASSIFIED: ICD-10-CM

## 2020-05-11 DIAGNOSIS — J44.9 CHRONIC OBSTRUCTIVE PULMONARY DISEASE, UNSPECIFIED: ICD-10-CM

## 2020-05-11 DIAGNOSIS — R27.0 ATAXIA, UNSPECIFIED: ICD-10-CM

## 2020-05-11 DIAGNOSIS — I70.0 ATHEROSCLEROSIS OF AORTA: ICD-10-CM

## 2020-05-11 DIAGNOSIS — E66.9 OBESITY, UNSPECIFIED: ICD-10-CM

## 2020-05-11 DIAGNOSIS — I63.511 CEREBRAL INFARCTION DUE TO UNSPECIFIED OCCLUSION OR STENOSIS OF RIGHT MIDDLE CEREBRAL ARTERY: ICD-10-CM

## 2020-05-11 DIAGNOSIS — Z86.73 PERSONAL HISTORY OF TRANSIENT ISCHEMIC ATTACK (TIA), AND CEREBRAL INFARCTION WITHOUT RESIDUAL DEFICITS: ICD-10-CM

## 2020-05-11 DIAGNOSIS — R29.702 NIHSS SCORE 2: ICD-10-CM

## 2020-05-11 DIAGNOSIS — I73.9 PERIPHERAL VASCULAR DISEASE, UNSPECIFIED: ICD-10-CM

## 2020-05-25 NOTE — SWALLOW BEDSIDE ASSESSMENT ADULT - NS SPL SWALLOW CLINIC TRIAL FT
No s/s aspiration/penetration.
+toleration with overt s/s of aspiration vs penetration  mild oral dysphagia for regular
Altered mental status

## 2023-03-24 NOTE — CONSULT NOTE ADULT - ASSESSMENT
IMPRESSION: Rehab of CVA /Ataxia    PRECAUTIONS: [    ] Cardiac  [   x ] Respiratory  [    ] Seizures [    ] Contact Isolation  [    ] Droplet Isolation  [    ] Other    Weight Bearing Status:     RECOMMENDATION:    Out of Bed to Chair     DVT/Decubiti Prophylaxis    REHAB PLAN:     [   x  ] Bedside P/T 3-5 times a week   [  x   ] Bedside O/T  2-3 times a week   [     ] No Rehab Therapy Indicated   [     ]  Speech Therapy   Conditioning/ROM                                 ADL  Bed Mobility                                            Conditioning/ROM  Transfers                                                  Bed Mobility  Sitting /Standing Balance                      Transfers                                        Gait Training                                            Sitting/Standing Balance  Stair Training [   ]Applicable                 Home equipment Eval                                                                     Splinting  [   ] Only      GOALS:   ADL   [ x   ]   Independent         Transfers  [ x   ] Independent            Ambulation  [   x  ] Independent     [   x  ] With device                            [    ]  CG                                               [    ]  CG                                                    [     ] CG                            [    ] Min A                                          [    ] Min A                                                [     ] Min  A          DISCHARGE PLAN:   [  x   ]  Good candidate for Intensive Rehabilitation/Hospital based                                             Will tolerate 3hrs Intensive Rehab Daily HOWEVER NO 4A UNIT                             VS                                     [  x    ]  Short Term Rehab in Skilled Nursing Facility                            VS                                     [  x    ]  Home with Outpatient or VN services  PATIENT STATES SHE WILL GO HOME WITH SISTER TO HER HOME IN  [facility] AND RECEIVE HOME PT THERE- NEEDS SOC SERVICE                                       [      ]  Possible Candidate for Intensive Hospital based Rehab Render Risk Assessment In Note?: no Detail Level: Simple Additional Notes: Discussed excision, approximately 1.2 cm

## 2023-09-24 NOTE — ED PROVIDER NOTE - CROS ED RESP ALL NEG
negative...